# Patient Record
Sex: MALE | Race: WHITE | NOT HISPANIC OR LATINO | URBAN - METROPOLITAN AREA
[De-identification: names, ages, dates, MRNs, and addresses within clinical notes are randomized per-mention and may not be internally consistent; named-entity substitution may affect disease eponyms.]

---

## 2021-01-22 ENCOUNTER — OFFICE (OUTPATIENT)
Dept: URBAN - METROPOLITAN AREA CLINIC 34 | Facility: CLINIC | Age: 56
End: 2021-01-22

## 2021-01-22 VITALS
HEART RATE: 83 BPM | HEIGHT: 72 IN | DIASTOLIC BLOOD PRESSURE: 84 MMHG | SYSTOLIC BLOOD PRESSURE: 140 MMHG | WEIGHT: 184.8 LBS | TEMPERATURE: 97.8 F

## 2021-01-22 DIAGNOSIS — R74.8 ABNORMAL LEVELS OF OTHER SERUM ENZYMES: ICD-10-CM

## 2021-01-22 PROCEDURE — 99244 OFF/OP CNSLTJ NEW/EST MOD 40: CPT | Performed by: PHYSICIAN ASSISTANT

## 2021-01-22 NOTE — INTERFACERESULTNOTES
IgA 47 (lln 90) so celiac panel less accurate; plt 159, AST/ALT 40/61, , TG 79, HDL 53, glucose 87, iron sat 39, INR 1.0, HgbA1C 5.4, TSH wnl, ASMA neg, Vit D 30.4, cerulo wnl, A1AT wnl, ferritin 219, KAITLIN neg

## 2021-01-22 NOTE — SERVICEHPINOTES
JOSSELINE SALTER   is a   55   year old male who is being seen in consultation at the request of   ASHWINI MALONEY   for elevated liver enzymes. Patient says he has had mild liver enzyme elevation for about a year, though also reports h/o this a few years ago as well. Says it went away at that time. He recently had negative hepatitis B &amp C testing with his PCP. He reports h/o drinking 2-6 beers per day for many years. He stopped drinking in late December to see if this helps his liver enzymes. Had an U/S in October showing normal liver other than liver cyst. He denies herbal medications or high dose tylenol. No h/o jaundice. Feels well overall, no other concerns today.Notes h/o EoE diagnosed on EGD in past and has since been on omeprazole 40 mg with resolution of symptoms.He has family h/o colon cancer and will be due for colonoscopy in December of this year.12/23/20 plt 155, AST/ALT 47/56, HBsAg neg, HBcAb total neg, HBsAb pos, Hep C ab neg BR12/14/20 AST/ALT 35/52BR9/24/20 AST/ALT 50/59

## 2021-01-22 NOTE — INTERFACERESULTNOTES
Vitamin D is low normal so recommend daily supplement - increase dose if already taking. No over cause of liver disease seen, though will be best to discuss all results and considerations at time of f/u visit next week.

## 2021-02-01 ENCOUNTER — OFFICE (OUTPATIENT)
Dept: URBAN - METROPOLITAN AREA CLINIC 102 | Facility: CLINIC | Age: 56
End: 2021-02-01

## 2021-02-01 DIAGNOSIS — R74.8 ABNORMAL LEVELS OF OTHER SERUM ENZYMES: ICD-10-CM

## 2021-02-01 LAB
ACTIN (SMOOTH MUSCLE) ANTIBODY: 4 UNITS (ref 0–19)
ALPHA-1-ANTITRYPSIN, SERUM: 133 MG/DL (ref 101–187)
AMBIG ABBREV CMP14 DEFAULT: (no result)
AMBIG ABBREV LP DEFAULT: (no result)
ANTINUCLEAR ANTIBODIES, IFA: NEGATIVE
CBC WITH DIFFERENTIAL/PLATELET: BASO (ABSOLUTE): 0 X10E3/UL (ref 0–0.2)
CBC WITH DIFFERENTIAL/PLATELET: BASOS: 1 %
CBC WITH DIFFERENTIAL/PLATELET: EOS (ABSOLUTE): 0.1 X10E3/UL (ref 0–0.4)
CBC WITH DIFFERENTIAL/PLATELET: EOS: 2 %
CBC WITH DIFFERENTIAL/PLATELET: HEMATOCRIT: 44.4 % (ref 37.5–51)
CBC WITH DIFFERENTIAL/PLATELET: HEMOGLOBIN: 15.4 G/DL (ref 13–17.7)
CBC WITH DIFFERENTIAL/PLATELET: IMMATURE GRANS (ABS): 0 X10E3/UL (ref 0–0.1)
CBC WITH DIFFERENTIAL/PLATELET: IMMATURE GRANULOCYTES: 0 %
CBC WITH DIFFERENTIAL/PLATELET: LYMPHS (ABSOLUTE): 1.1 X10E3/UL (ref 0.7–3.1)
CBC WITH DIFFERENTIAL/PLATELET: LYMPHS: 19 %
CBC WITH DIFFERENTIAL/PLATELET: MCH: 32.6 PG (ref 26.6–33)
CBC WITH DIFFERENTIAL/PLATELET: MCHC: 34.7 G/DL (ref 31.5–35.7)
CBC WITH DIFFERENTIAL/PLATELET: MCV: 94 FL (ref 79–97)
CBC WITH DIFFERENTIAL/PLATELET: MONOCYTES(ABSOLUTE): 0.6 X10E3/UL (ref 0.1–0.9)
CBC WITH DIFFERENTIAL/PLATELET: MONOCYTES: 9 %
CBC WITH DIFFERENTIAL/PLATELET: NEUTROPHILS (ABSOLUTE): 4.1 X10E3/UL (ref 1.4–7)
CBC WITH DIFFERENTIAL/PLATELET: NEUTROPHILS: 69 %
CBC WITH DIFFERENTIAL/PLATELET: PLATELETS: 159 X10E3/UL (ref 150–450)
CBC WITH DIFFERENTIAL/PLATELET: RBC: 4.72 X10E6/UL (ref 4.14–5.8)
CBC WITH DIFFERENTIAL/PLATELET: RDW: 11.6 % (ref 11.6–15.4)
CBC WITH DIFFERENTIAL/PLATELET: WBC: 5.9 X10E3/UL (ref 3.4–10.8)
CELIAC DISEASE COMPREHENSIVE: DEAMIDATED GLIADIN ABS, IGA: 2 UNITS (ref 0–19)
CELIAC DISEASE COMPREHENSIVE: DEAMIDATED GLIADIN ABS, IGG: 7 UNITS (ref 0–19)
CELIAC DISEASE COMPREHENSIVE: ENDOMYSIAL ANTIBODY IGA: NEGATIVE
CELIAC DISEASE COMPREHENSIVE: IMMUNOGLOBULIN A, QN, SERUM: 47 MG/DL — LOW (ref 90–386)
CELIAC DISEASE COMPREHENSIVE: T-TRANSGLUTAMINASE (TTG) IGA: <2 U/ML
CELIAC DISEASE COMPREHENSIVE: T-TRANSGLUTAMINASE (TTG) IGG: 3 U/ML (ref 0–5)
CERULOPLASMIN: 18.3 MG/DL (ref 16–31)
COMP. METABOLIC PANEL (14): A/G RATIO: 2.8 — HIGH (ref 1.2–2.2)
COMP. METABOLIC PANEL (14): ALBUMIN: 4.8 G/DL (ref 3.8–4.9)
COMP. METABOLIC PANEL (14): ALKALINE PHOSPHATASE: 66 IU/L (ref 39–117)
COMP. METABOLIC PANEL (14): ALT (SGPT): 61 IU/L — HIGH (ref 0–44)
COMP. METABOLIC PANEL (14): AST (SGOT): 40 IU/L (ref 0–40)
COMP. METABOLIC PANEL (14): BILIRUBIN, TOTAL: 0.4 MG/DL (ref 0–1.2)
COMP. METABOLIC PANEL (14): BUN/CREATININE RATIO: 20 (ref 9–20)
COMP. METABOLIC PANEL (14): BUN: 17 MG/DL (ref 6–24)
COMP. METABOLIC PANEL (14): CALCIUM: 9.2 MG/DL (ref 8.7–10.2)
COMP. METABOLIC PANEL (14): CARBON DIOXIDE, TOTAL: 26 MMOL/L (ref 20–29)
COMP. METABOLIC PANEL (14): CHLORIDE: 100 MMOL/L (ref 96–106)
COMP. METABOLIC PANEL (14): CREATININE: 0.85 MG/DL (ref 0.76–1.27)
COMP. METABOLIC PANEL (14): EGFR IF AFRICN AM: 112 ML/MIN/1.73 (ref 59–?)
COMP. METABOLIC PANEL (14): EGFR IF NONAFRICN AM: 97 ML/MIN/1.73 (ref 59–?)
COMP. METABOLIC PANEL (14): GLOBULIN, TOTAL: 1.7 G/DL (ref 1.5–4.5)
COMP. METABOLIC PANEL (14): GLUCOSE: 87 MG/DL (ref 65–99)
COMP. METABOLIC PANEL (14): POTASSIUM: 4.4 MMOL/L (ref 3.5–5.2)
COMP. METABOLIC PANEL (14): PROTEIN, TOTAL: 6.5 G/DL (ref 6–8.5)
COMP. METABOLIC PANEL (14): SODIUM: 141 MMOL/L (ref 134–144)
FERRITIN, SERUM: 219 NG/ML (ref 30–400)
HEMOGLOBIN A1C: 5.4 % (ref 4.8–5.6)
IRON AND TIBC: IRON BIND.CAP.(TIBC): 311 UG/DL (ref 250–450)
IRON AND TIBC: IRON SATURATION: 39 % (ref 15–55)
IRON AND TIBC: IRON: 120 UG/DL (ref 38–169)
IRON AND TIBC: UIBC: 191 UG/DL (ref 111–343)
LIPID PANEL: CHOLESTEROL, TOTAL: 228 MG/DL — HIGH (ref 100–199)
LIPID PANEL: HDL CHOLESTEROL: 53 MG/DL (ref 39–?)
LIPID PANEL: LDL CHOL CALC (NIH): 161 MG/DL — HIGH (ref 0–99)
LIPID PANEL: TRIGLYCERIDES: 79 MG/DL (ref 0–149)
LIPID PANEL: VLDL CHOLESTEROL CAL: 14 MG/DL (ref 5–40)
PROTHROMBIN TIME (PT): INR: 1 (ref 0.9–1.2)
PROTHROMBIN TIME (PT): PROTHROMBIN TIME: 10.7 SEC (ref 9.1–12)
TSH: 0.92 UIU/ML (ref 0.45–4.5)
VITAMIN D, 25-HYDROXY: 30.4 NG/ML (ref 30–100)

## 2021-02-01 PROCEDURE — 91200 LIVER ELASTOGRAPHY: CPT | Performed by: INTERNAL MEDICINE

## 2021-02-19 ENCOUNTER — OFFICE (OUTPATIENT)
Dept: URBAN - METROPOLITAN AREA CLINIC 34 | Facility: CLINIC | Age: 56
End: 2021-02-19

## 2021-02-19 VITALS
DIASTOLIC BLOOD PRESSURE: 86 MMHG | WEIGHT: 185 LBS | HEIGHT: 72 IN | HEART RATE: 80 BPM | TEMPERATURE: 97.2 F | SYSTOLIC BLOOD PRESSURE: 148 MMHG

## 2021-02-19 DIAGNOSIS — K20.0 EOSINOPHILIC ESOPHAGITIS: ICD-10-CM

## 2021-02-19 DIAGNOSIS — R74.8 ABNORMAL LEVELS OF OTHER SERUM ENZYMES: ICD-10-CM

## 2021-02-19 DIAGNOSIS — R68.89 OTHER GENERAL SYMPTOMS AND SIGNS: ICD-10-CM

## 2021-02-19 PROCEDURE — 99214 OFFICE O/P EST MOD 30 MIN: CPT | Performed by: PHYSICIAN ASSISTANT

## 2021-02-19 NOTE — SERVICEHPINOTES
57 yo male presents for f/u elevated liver enzymes. Recent Fibroscan was normal (S0, F0) and lab evaluation was unremarkable other than low total IgA noted on celiac panel. He feels well and has no complaints. He continues to avoid alcohol. ALT level is stable in the 50-60 range. No new concerns today. Prior hx: Patient says he has had mild liver enzyme elevation for about a year, though also reports h/o this a few years ago as well. Says it went away at that time. He recently had negative hepatitis B &amp C testing with his PCP. He reports h/o drinking 2-6 beers per day for many years. He stopped drinking in late December to see if this helps his liver enzymes. Had an abdominal U/S in October showing normal liver other than liver cyst. He denies herbal medications or high dose tylenol. No h/o jaundice. Feels well overall, no other concerns today.Notes h/o EoE diagnosed on EGD in past and has since been on omeprazole 40 mg with resolution of symptoms.He has family h/o colon cancer and will be due for colonoscopy in December of this year.2/1/21 IgA 47 (lln 90) so celiac panel less accurate plt 159, AST/ALT 40/61, , TG 79, HDL 53, glucose 87, iron sat 39, INR 1.0, HgbA1C 5.4, TSH wnl, ASMA neg, Vit D 30.4, cerulo wnl, A1AT wnl, ferritin 219, KAITLIN negBR12/23/20 plt 155, AST/ALT 47/56, HBsAg neg, HBcAb total neg, HBsAb pos, Hep C ab neg BR12/14/20 AST/ALT 35/52BR9/24/20 AST/ALT 50/59